# Patient Record
Sex: FEMALE | Race: WHITE | NOT HISPANIC OR LATINO
[De-identification: names, ages, dates, MRNs, and addresses within clinical notes are randomized per-mention and may not be internally consistent; named-entity substitution may affect disease eponyms.]

---

## 2020-12-18 PROBLEM — Z00.00 ENCOUNTER FOR PREVENTIVE HEALTH EXAMINATION: Status: ACTIVE | Noted: 2020-12-18

## 2020-12-22 ENCOUNTER — APPOINTMENT (OUTPATIENT)
Dept: ULTRASOUND IMAGING | Facility: HOSPITAL | Age: 65
End: 2020-12-22
Payer: COMMERCIAL

## 2020-12-22 ENCOUNTER — OUTPATIENT (OUTPATIENT)
Dept: OUTPATIENT SERVICES | Facility: HOSPITAL | Age: 65
LOS: 1 days | End: 2020-12-22
Payer: COMMERCIAL

## 2020-12-22 PROCEDURE — 93880 EXTRACRANIAL BILAT STUDY: CPT | Mod: 26

## 2020-12-22 PROCEDURE — 93880 EXTRACRANIAL BILAT STUDY: CPT

## 2021-04-05 ENCOUNTER — OUTPATIENT (OUTPATIENT)
Dept: OUTPATIENT SERVICES | Facility: HOSPITAL | Age: 66
LOS: 1 days | End: 2021-04-05
Payer: COMMERCIAL

## 2021-04-05 DIAGNOSIS — R07.9 CHEST PAIN, UNSPECIFIED: ICD-10-CM

## 2021-04-05 PROCEDURE — 93017 CV STRESS TEST TRACING ONLY: CPT

## 2021-04-05 PROCEDURE — 78452 HT MUSCLE IMAGE SPECT MULT: CPT | Mod: 26

## 2021-04-05 PROCEDURE — 93016 CV STRESS TEST SUPVJ ONLY: CPT

## 2021-04-05 PROCEDURE — 93018 CV STRESS TEST I&R ONLY: CPT

## 2021-04-05 PROCEDURE — A9505: CPT

## 2021-04-05 PROCEDURE — A9500: CPT

## 2021-04-05 PROCEDURE — 78452 HT MUSCLE IMAGE SPECT MULT: CPT

## 2024-01-10 ENCOUNTER — APPOINTMENT (OUTPATIENT)
Dept: PULMONOLOGY | Facility: CLINIC | Age: 69
End: 2024-01-10
Payer: COMMERCIAL

## 2024-01-10 VITALS — OXYGEN SATURATION: 97 % | HEART RATE: 79 BPM

## 2024-01-10 DIAGNOSIS — M15.9 POLYOSTEOARTHRITIS, UNSPECIFIED: ICD-10-CM

## 2024-01-10 DIAGNOSIS — R06.00 DYSPNEA, UNSPECIFIED: ICD-10-CM

## 2024-01-10 DIAGNOSIS — R06.89 DYSPNEA, UNSPECIFIED: ICD-10-CM

## 2024-01-10 DIAGNOSIS — Z87.2 PERSONAL HISTORY OF DISEASES OF THE SKIN AND SUBCUTANEOUS TISSUE: ICD-10-CM

## 2024-01-10 PROCEDURE — 99204 OFFICE O/P NEW MOD 45 MIN: CPT

## 2024-01-11 ENCOUNTER — APPOINTMENT (OUTPATIENT)
Dept: PULMONOLOGY | Facility: CLINIC | Age: 69
End: 2024-01-11
Payer: COMMERCIAL

## 2024-01-11 PROCEDURE — 94729 DIFFUSING CAPACITY: CPT

## 2024-01-11 PROCEDURE — 94060 EVALUATION OF WHEEZING: CPT

## 2024-01-11 PROCEDURE — 94727 GAS DIL/WSHOT DETER LNG VOL: CPT

## 2024-01-30 ENCOUNTER — NON-APPOINTMENT (OUTPATIENT)
Age: 69
End: 2024-01-30

## 2024-02-01 ENCOUNTER — APPOINTMENT (OUTPATIENT)
Dept: INTERNAL MEDICINE | Facility: CLINIC | Age: 69
End: 2024-02-01
Payer: COMMERCIAL

## 2024-02-01 ENCOUNTER — LABORATORY RESULT (OUTPATIENT)
Age: 69
End: 2024-02-01

## 2024-02-01 VITALS
WEIGHT: 106 LBS | TEMPERATURE: 97.8 F | OXYGEN SATURATION: 98 % | SYSTOLIC BLOOD PRESSURE: 92 MMHG | HEART RATE: 68 BPM | HEIGHT: 64 IN | BODY MASS INDEX: 18.1 KG/M2 | DIASTOLIC BLOOD PRESSURE: 55 MMHG

## 2024-02-01 DIAGNOSIS — M85.80 OTHER SPECIFIED DISORDERS OF BONE DENSITY AND STRUCTURE, UNSPECIFIED SITE: ICD-10-CM

## 2024-02-01 DIAGNOSIS — Z00.01 ENCOUNTER FOR GENERAL ADULT MEDICAL EXAMINATION WITH ABNORMAL FINDINGS: ICD-10-CM

## 2024-02-01 DIAGNOSIS — M19.90 UNSPECIFIED OSTEOARTHRITIS, UNSPECIFIED SITE: ICD-10-CM

## 2024-02-01 DIAGNOSIS — R10.13 EPIGASTRIC PAIN: ICD-10-CM

## 2024-02-01 DIAGNOSIS — Z85.828 PERSONAL HISTORY OF OTHER MALIGNANT NEOPLASM OF SKIN: ICD-10-CM

## 2024-02-01 DIAGNOSIS — I73.00 RAYNAUD'S SYNDROME W/OUT GANGRENE: ICD-10-CM

## 2024-02-01 PROCEDURE — 99204 OFFICE O/P NEW MOD 45 MIN: CPT | Mod: 25,GC

## 2024-02-01 PROCEDURE — 36415 COLL VENOUS BLD VENIPUNCTURE: CPT

## 2024-02-01 NOTE — ASSESSMENT
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  #Epigastric pain Started approximately 1 week ago. Episodic 15 min "menstrual-like" cramping. Self-limiting. No severe pain, nausea, vomiting, constipation, diarrhea, hematemesis, or systemic symptoms. Benign abdominal exam. - continue to monitor  #Health maintenance - ordered CBC, CMP, lipid panel, TSH - Up to date on breast Ca screening  - Up to date on colon Ca screening () - Deferring Prevnar, Shingrix

## 2024-02-01 NOTE — HISTORY OF PRESENT ILLNESS
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  Patient takes no medications. Diet/Supplements: Gluten-free, mostly diary-free, Algae-Richard/Strontium, Probiotic, Vitamin C & D, Zinc, Fish Oil, Daily fruit/green smoothie with flax seeds, inge seeds, sunflower lecithin, collagen. ALL penicillin, nickel, quaternium, formaldehyde.  Family History: Breast Ca, DM, Arthritis, PD (mother and father), Hemochromatosis, Matty-Danlos, Leukemia, ANCA-related disorders. Social History: social alcohol use, no smoking or illicit drug use. Regular physical activity. Former ballet dancer.   Epigastric pain: Started approximately 1 week ago. Episodic 15 min "menstrual-like" cramping. Self-limiting. No severe pain, nausea, vomiting, constipation, diarrhea, hematemesis, or systemic symptoms. Benign abdominal exam.

## 2024-02-02 ENCOUNTER — NON-APPOINTMENT (OUTPATIENT)
Age: 69
End: 2024-02-02

## 2024-02-02 DIAGNOSIS — D56.3 THALASSEMIA MINOR: ICD-10-CM

## 2024-02-02 DIAGNOSIS — D50.9 IRON DEFICIENCY ANEMIA, UNSPECIFIED: ICD-10-CM

## 2024-02-02 LAB
ALBUMIN SERPL ELPH-MCNC: 4.6 G/DL
ALP BLD-CCNC: 66 U/L
ALT SERPL-CCNC: 14 U/L
ANION GAP SERPL CALC-SCNC: 11 MMOL/L
AST SERPL-CCNC: 18 U/L
BASOPHILS # BLD AUTO: 0.06 K/UL
BASOPHILS NFR BLD AUTO: 0.9 %
BILIRUB SERPL-MCNC: 0.7 MG/DL
BUN SERPL-MCNC: 11 MG/DL
CALCIUM SERPL-MCNC: 9.1 MG/DL
CHLORIDE SERPL-SCNC: 99 MMOL/L
CHOLEST SERPL-MCNC: 223 MG/DL
CO2 SERPL-SCNC: 26 MMOL/L
CREAT SERPL-MCNC: 0.73 MG/DL
EGFR: 91 ML/MIN/1.73M2
EOSINOPHIL # BLD AUTO: 0.12 K/UL
EOSINOPHIL NFR BLD AUTO: 1.9 %
GLUCOSE SERPL-MCNC: 85 MG/DL
HCT VFR BLD CALC: 29.8 %
HDLC SERPL-MCNC: 72 MG/DL
HGB BLD-MCNC: 9.1 G/DL
IMM GRANULOCYTES NFR BLD AUTO: 0.3 %
LDLC SERPL CALC-MCNC: 140 MG/DL
LYMPHOCYTES # BLD AUTO: 2.03 K/UL
LYMPHOCYTES NFR BLD AUTO: 31.5 %
MAN DIFF?: NORMAL
MCHC RBC-ENTMCNC: 19.5 PG
MCHC RBC-ENTMCNC: 30.5 GM/DL
MCV RBC AUTO: 63.8 FL
MONOCYTES # BLD AUTO: 0.53 K/UL
MONOCYTES NFR BLD AUTO: 8.2 %
NEUTROPHILS # BLD AUTO: 3.68 K/UL
NEUTROPHILS NFR BLD AUTO: 57.2 %
NONHDLC SERPL-MCNC: 151 MG/DL
PLATELET # BLD AUTO: 213 K/UL
POTASSIUM SERPL-SCNC: 4.4 MMOL/L
PROT SERPL-MCNC: 6.6 G/DL
RBC # BLD: 4.67 M/UL
RBC # FLD: 19.4 %
SODIUM SERPL-SCNC: 136 MMOL/L
TRIGL SERPL-MCNC: 63 MG/DL
TSH SERPL-ACNC: 1.16 UIU/ML
WBC # FLD AUTO: 6.44 K/UL

## 2024-05-21 NOTE — CONSULT LETTER
[Dear  ___] : Dear  [unfilled], [( Thank you for referring [unfilled] for consultation for _____ )] : Thank you for referring [unfilled] for consultation for [unfilled] [Please see my note below.] : Please see my note below. [Consult Closing:] : Thank you very much for allowing me to participate in the care of this patient.  If you have any questions, please do not hesitate to contact me. [Sincerely,] : Sincerely, [FreeTextEntry2] : Jolie Posada MD [address] [place] [address] [FreeTextEntry3] : Emily Gilman MD, [facility]

## 2024-05-21 NOTE — HISTORY OF PRESENT ILLNESS
[Never] : never [TextBox_4] : 01/10/2024: Asked to evaluate patient by Dr Posada for dyspnea and L chest pain when she lies down. Dr Posada says heart is fine, CXR is fine. Years ago saw Dr Damaso Vann for dypsnea, found to have some nodules, [place] [facility] [place] 2 years and was stable. At that time the dyspnea was thought perhaps to be anxiety, which could also be the case now, she thinks. Lots going on, daughter at college. Usually feels dyspneic when it's time to go to bed, has to sit up and take a deep breath. Ok with yoga, dance, walking. Never smoker.  working from home. No cough or wheeze. Was seen in [facility] a few weeks ago for a URI, which resolved. Has eczema.

## 2024-05-21 NOTE — PHYSICAL EXAM
[No Acute Distress] : no acute distress [Normal Rate/Rhythm] : normal rate/rhythm [Normal S1, S2] : normal s1, s2 [No Murmurs] : no murmurs [No Resp Distress] : no resp distress [Clear to Auscultation Bilaterally] : clear to auscultation bilaterally [No Clubbing] : no clubbing [No Edema] : no edema [Normal Affect] : normal affect [TextBox_105] : deformities of PIPs

## 2024-05-21 NOTE — ASSESSMENT
[FreeTextEntry1] : Data reviewed:  PA/lat CXR [facility] 10/2023 personally reviewed : a little hyperinflated; apical thickening on R was followed remotely (see scanned docs)  Impression: Dyspnea, not physiologic  Plan: She has rest dyspnea in the evenings without dyspnea on exertion. This is a common sensation and does not portend lung disease; discussed. Will check PFT at her convenience and if normal, no further workup. -- PFT 1/11/2024: normal vanna, normal volumes, unadjusted DLCO 55% but would be normal adjusted for Hgb 9.6 as in Oct / would not work up further, LM on VM

## 2025-03-31 ENCOUNTER — APPOINTMENT (OUTPATIENT)
Dept: CT IMAGING | Facility: HOSPITAL | Age: 70
End: 2025-03-31

## 2025-03-31 ENCOUNTER — OUTPATIENT (OUTPATIENT)
Dept: OUTPATIENT SERVICES | Facility: HOSPITAL | Age: 70
LOS: 1 days | End: 2025-03-31
Payer: SELF-PAY

## 2025-03-31 PROCEDURE — 75571 CT HRT W/O DYE W/CA TEST: CPT

## 2025-03-31 PROCEDURE — 75571 CT HRT W/O DYE W/CA TEST: CPT | Mod: 26
